# Patient Record
Sex: FEMALE
[De-identification: names, ages, dates, MRNs, and addresses within clinical notes are randomized per-mention and may not be internally consistent; named-entity substitution may affect disease eponyms.]

---

## 2021-12-15 PROBLEM — Z00.00 ENCOUNTER FOR PREVENTIVE HEALTH EXAMINATION: Status: ACTIVE | Noted: 2021-12-15

## 2021-12-20 ENCOUNTER — APPOINTMENT (OUTPATIENT)
Dept: COLORECTAL SURGERY | Facility: CLINIC | Age: 46
End: 2021-12-20

## 2021-12-20 ENCOUNTER — APPOINTMENT (OUTPATIENT)
Dept: COLORECTAL SURGERY | Facility: CLINIC | Age: 46
End: 2021-12-20
Payer: COMMERCIAL

## 2021-12-20 VITALS
DIASTOLIC BLOOD PRESSURE: 92 MMHG | HEIGHT: 61 IN | HEART RATE: 93 BPM | WEIGHT: 130 LBS | OXYGEN SATURATION: 98 % | BODY MASS INDEX: 24.55 KG/M2 | TEMPERATURE: 97.8 F | SYSTOLIC BLOOD PRESSURE: 127 MMHG

## 2021-12-20 DIAGNOSIS — Z83.438 FAMILY HISTORY OF OTHER DISORDER OF LIPOPROTEIN METABOLISM AND OTHER LIPIDEMIA: ICD-10-CM

## 2021-12-20 DIAGNOSIS — E05.90 THYROTOXICOSIS, UNSPECIFIED W/OUT THYROTOXIC CRISIS OR STORM: ICD-10-CM

## 2021-12-20 DIAGNOSIS — Z12.11 ENCOUNTER FOR SCREENING FOR MALIGNANT NEOPLASM OF COLON: ICD-10-CM

## 2021-12-20 DIAGNOSIS — K76.0 FATTY (CHANGE OF) LIVER, NOT ELSEWHERE CLASSIFIED: ICD-10-CM

## 2021-12-20 DIAGNOSIS — K64.4 RESIDUAL HEMORRHOIDAL SKIN TAGS: ICD-10-CM

## 2021-12-20 DIAGNOSIS — Z78.9 OTHER SPECIFIED HEALTH STATUS: ICD-10-CM

## 2021-12-20 DIAGNOSIS — Z82.49 FAMILY HISTORY OF ISCHEMIC HEART DISEASE AND OTHER DISEASES OF THE CIRCULATORY SYSTEM: ICD-10-CM

## 2021-12-20 DIAGNOSIS — E03.9 HYPOTHYROIDISM, UNSPECIFIED: ICD-10-CM

## 2021-12-20 DIAGNOSIS — K64.8 OTHER HEMORRHOIDS: ICD-10-CM

## 2021-12-20 PROCEDURE — 46600 DIAGNOSTIC ANOSCOPY SPX: CPT

## 2021-12-20 PROCEDURE — 99204 OFFICE O/P NEW MOD 45 MIN: CPT | Mod: 25

## 2021-12-20 RX ORDER — GALCANEZUMAB 120 MG/ML
120 INJECTION, SOLUTION SUBCUTANEOUS
Qty: 1 | Refills: 0 | Status: ACTIVE | COMMUNITY
Start: 2021-12-13

## 2021-12-20 RX ORDER — LEVOTHYROXINE SODIUM 112 UG/1
112 TABLET ORAL
Qty: 30 | Refills: 0 | Status: ACTIVE | COMMUNITY
Start: 2021-12-17

## 2021-12-20 RX ORDER — SERTRALINE 25 MG/1
25 TABLET, FILM COATED ORAL
Qty: 30 | Refills: 0 | Status: ACTIVE | COMMUNITY
Start: 2021-12-17

## 2021-12-20 RX ORDER — HYDROCORTISONE 25 MG/G
2.5 CREAM TOPICAL
Qty: 1 | Refills: 3 | Status: ACTIVE | COMMUNITY
Start: 2021-12-20 | End: 1900-01-01

## 2021-12-20 NOTE — PHYSICAL EXAM
[FreeTextEntry1] : Medical assistant present for duration of physical examination\par \par General no acute distress, alert and oriented\par Psych calm, pleasant demeanor, responding appropriately to questions\par Nonlabored breathing\par Ambulating without assistance\par Skin normal color and pigment, no visible lesions or rashes\par \par Anorectal Exam:\par Inspection no erythema, induration or fluctuance, no skin excoriation, no fissure, soft external hemorrhoid posteriorly and right anterior without inflammation or thrombosis\par BRIANA nontender, no masses palpated, no blood on gloved finger\par \par Procedure: Anoscopy\par \par Pre procedure Diagnosis: hemorrhoids\par Post procedure Diagnosis: hemorrhoids\par Anesthesia: none\par Estimated blood loss: none\par Specimen: none\par Complications: none\par \par Consent obtained. Anoscopy was performed by passing a lighted anoscope with lubricant jelly into the anal canal and the entire anal mucosal surface was inspected. Findings included no fissure, mildly inflamed internal hemorrhoids, no visible masses or lesions in anal canal\par \par Patient tolerated examination and procedure well.\par \par \par

## 2021-12-20 NOTE — HISTORY OF PRESENT ILLNESS
[FreeTextEntry1] : 45yo female presents for initial evaluation\par Referred by Dr Herlinda Fagan\par Reason for visit "hemorrhoids"\par \par Patient reports she developed hemorrhoids with pregnancy 22 years ago.\par States they were manageable and if the prolapsed she could push them back in.\par \par In  she had her gallbladder removed. She states after surgery she had looser stools and her hemorrhoids got worse. She notes external hemorrhoids are always present.\par She returned to her surgeon in  and was advised to take fiber.\par She states she takes Konsyl every other day.\par She also takes a probiotic.\par She states she no longer has looser stools, denies diarrhea or constipation.\par \par H/o thyroid disease. 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 Symptoms are relieved with BM.\par Noted small amount of BRB on TP when wiping\par Using OTC hemorrhoid cream and preparation H wipes in past week with improvement\par \par Denies being prescribed hemorrhoid medication in past.\par \par Reports history of perianal itching and scratching with aggressive wiping in past.\par Now only uses baby wipes to clean\par \par Drinks 2 cups of coffee daily and one small can of coke (previously drank 3 regular sized cans of coke)\par \par Never had a colonoscopy\par Denies family history of CRC or IBD\par \par H/o 4 pregnancies, 2 , no c sections\par \par H/o migraines on Emgality

## 2021-12-20 NOTE — ASSESSMENT
[FreeTextEntry1] : Exam findings and diagnosis were discussed at length with patient. \par Recommendations including increased fiber intake, adequate daily hydration, stool softeners as needed, and sitz baths as needed and after bowel movements were discussed.\par Avoid constipation and diarrhea, avoid pushing/straining.\par Medical management, such as hydrocortisone cream, was discussed.\par Office based treatment, such as rubber band ligation, was discussed as an alternative if symptoms persist despite conservative management.\par Role of hemorrhoidectomy also discussed.\par Office procedure and surgical risks and benefits discussed.\par \par I have recommended full evaluation of the colon and rectum with colonoscopy.\par All pertinent issues were discussed with the patient and the concern for the possibility of neoplasm or inflammatory condition was explained.\par The importance of full evaluation of the colon and rectum with colonoscopy was stressed.\par The procedure was explained in full detail, including prep selected, procedure length and time from admission to discharge.\par The need for stopping certain medications has been explained in full detail.\par Risks, benefits, and alternatives of colonoscopy, including but not limited to incomplete penetration of the colon and possible need for further work up, perforation, hemorrhage, either immediate or delayed, with necessity for hospitalization, possible blood transfusions, possible repeat colonoscopy and possible operation, were explained.\par All questions were answered, the patient expressed understanding, and would like to proceed with scheduling colonoscopy. \par Informed consent was obtained.\par \par Patient expressed interest in rubber band ligation treatments for hemorrhoids. She will consider pursuing in future, and would like to proceed with colonoscopy first. If no improvement with rubber banding, she may consider surgical management. Rx for hydrocortisone as needed for symptoms provided.\par \par All questions answered, patient expressed understanding and is agreeable to this plan.\par

## 2022-04-08 ENCOUNTER — APPOINTMENT (OUTPATIENT)
Dept: COLORECTAL SURGERY | Facility: HOSPITAL | Age: 47
End: 2022-04-08